# Patient Record
Sex: FEMALE | Race: WHITE | NOT HISPANIC OR LATINO | URBAN - METROPOLITAN AREA
[De-identification: names, ages, dates, MRNs, and addresses within clinical notes are randomized per-mention and may not be internally consistent; named-entity substitution may affect disease eponyms.]

---

## 2022-07-09 ENCOUNTER — TELEPHONE ENCOUNTER (OUTPATIENT)
Dept: URBAN - METROPOLITAN AREA CLINIC 121 | Facility: CLINIC | Age: 57
End: 2022-07-09

## 2022-07-10 ENCOUNTER — TELEPHONE ENCOUNTER (OUTPATIENT)
Dept: URBAN - METROPOLITAN AREA CLINIC 121 | Facility: CLINIC | Age: 57
End: 2022-07-10

## 2022-07-10 RX ORDER — MULTIVIT-MIN/FOLIC/VIT K/LYCOP 400-300MCG
TABLET ORAL
Refills: 0 | Status: ACTIVE | COMMUNITY
Start: 2014-09-09

## 2022-07-10 RX ORDER — LAMOTRIGINE 150 MG/1
TABLET ORAL TWICE A DAY
Refills: 0 | Status: ACTIVE | COMMUNITY
Start: 2014-09-09

## 2022-07-10 RX ORDER — ISOSORBIDE DINITRATE 30 MG/1
TABLET ORAL ONCE A DAY
Refills: 0 | Status: ACTIVE | COMMUNITY
Start: 2014-09-09

## 2022-07-10 RX ORDER — ASPIRIN 81 MG/1
TABLET, DELAYED RELEASE ORAL
Refills: 0 | Status: ACTIVE | COMMUNITY
Start: 2014-09-09

## 2022-07-10 RX ORDER — NITROGLYCERIN 80 MG/1
PATCH TRANSDERMAL AS NEEDED
Refills: 0 | Status: ACTIVE | COMMUNITY
Start: 2014-09-09

## 2022-07-10 RX ORDER — SIMVASTATIN 40 MG/1
TABLET, FILM COATED ORAL ONCE A DAY
Refills: 0 | Status: ACTIVE | COMMUNITY
Start: 2014-09-09

## 2022-12-02 ENCOUNTER — LAB OUTSIDE AN ENCOUNTER (OUTPATIENT)
Dept: URBAN - METROPOLITAN AREA CLINIC 63 | Facility: CLINIC | Age: 57
End: 2022-12-02

## 2022-12-02 ENCOUNTER — WEB ENCOUNTER (OUTPATIENT)
Dept: URBAN - METROPOLITAN AREA CLINIC 63 | Facility: CLINIC | Age: 57
End: 2022-12-02

## 2022-12-02 ENCOUNTER — OFFICE VISIT (OUTPATIENT)
Dept: URBAN - METROPOLITAN AREA CLINIC 63 | Facility: CLINIC | Age: 57
End: 2022-12-02
Payer: OTHER GOVERNMENT

## 2022-12-02 VITALS
DIASTOLIC BLOOD PRESSURE: 80 MMHG | BODY MASS INDEX: 25.77 KG/M2 | HEART RATE: 69 BPM | HEIGHT: 70 IN | WEIGHT: 180 LBS | OXYGEN SATURATION: 95 % | SYSTOLIC BLOOD PRESSURE: 120 MMHG | TEMPERATURE: 98.8 F

## 2022-12-02 DIAGNOSIS — K44.9 HIATAL HERNIA: ICD-10-CM

## 2022-12-02 DIAGNOSIS — R13.19 ESOPHAGEAL DYSPHAGIA: ICD-10-CM

## 2022-12-02 DIAGNOSIS — Z12.11 SCREENING FOR COLON CANCER: ICD-10-CM

## 2022-12-02 PROCEDURE — 99204 OFFICE O/P NEW MOD 45 MIN: CPT | Performed by: INTERNAL MEDICINE

## 2022-12-02 RX ORDER — ASPIRIN 81 MG/1
TABLET, DELAYED RELEASE ORAL
Refills: 0 | COMMUNITY
Start: 2014-09-09

## 2022-12-02 RX ORDER — NITROGLYCERIN 80 MG/1
PATCH TRANSDERMAL AS NEEDED
Refills: 0 | COMMUNITY
Start: 2014-09-09

## 2022-12-02 RX ORDER — LAMOTRIGINE 150 MG/1
TABLET ORAL TWICE A DAY
Refills: 0 | COMMUNITY
Start: 2014-09-09

## 2022-12-02 RX ORDER — ISOSORBIDE DINITRATE 30 MG/1
TABLET ORAL ONCE A DAY
Refills: 0 | COMMUNITY
Start: 2014-09-09

## 2022-12-02 RX ORDER — SIMVASTATIN 40 MG/1
TABLET, FILM COATED ORAL ONCE A DAY
Refills: 0 | COMMUNITY
Start: 2014-09-09

## 2022-12-02 RX ORDER — MULTIVIT-MIN/FOLIC/VIT K/LYCOP 400-300MCG
TABLET ORAL
Refills: 0 | COMMUNITY
Start: 2014-09-09

## 2022-12-02 RX ORDER — POLYETHYLENE GLYCOL 3350, SODIUM SULFATE, SODIUM CHLORIDE, POTASSIUM CHLORIDE, ASCORBIC ACID, SODIUM ASCORBATE 140-9-5.2G
AS DIRECTED KIT ORAL ONCE
Qty: 1 | Refills: 0 | OUTPATIENT
Start: 2022-12-02 | End: 2022-12-03

## 2022-12-02 NOTE — HPI-TODAY'S VISIT:
This a 57-year-old female with traumatic brain injury, nonobstructive CAD on multiple cardiac medications coming in for esophageal dysphagia.  Patient has had Nissen's fundoplication several years ago.  [facility] showed a paraesophageal hernia?  and the GE junction was dilated at that time to 57 French.  Patient has had multiple dilations.  She states that she has had a couple of dilations done this year as well.  Did not have a barium esophagram for review. Last colonoscopy for screening was done several years ago as well.  Does not recollect if she has polyps.  Has no sensation when she has a bowel movement and that has been going on for several years now.  Takes fiber as needed and that seems to help her.  No blood in the stool.  No family history of colon cancer. Does not drink significant amount of caffeine or soda.

## 2023-05-01 ENCOUNTER — TELEPHONE ENCOUNTER (OUTPATIENT)
Dept: URBAN - METROPOLITAN AREA CLINIC 63 | Facility: CLINIC | Age: 58
End: 2023-05-01

## 2023-05-03 ENCOUNTER — TELEPHONE ENCOUNTER (OUTPATIENT)
Dept: URBAN - METROPOLITAN AREA CLINIC 60 | Facility: CLINIC | Age: 58
End: 2023-05-03

## 2023-05-12 ENCOUNTER — OFFICE VISIT (OUTPATIENT)
Dept: URBAN - METROPOLITAN AREA CLINIC 60 | Facility: CLINIC | Age: 58
End: 2023-05-12

## 2023-05-31 ENCOUNTER — OFFICE VISIT (OUTPATIENT)
Dept: URBAN - METROPOLITAN AREA CLINIC 63 | Facility: CLINIC | Age: 58
End: 2023-05-31
Payer: OTHER GOVERNMENT

## 2023-05-31 ENCOUNTER — DASHBOARD ENCOUNTERS (OUTPATIENT)
Age: 58
End: 2023-05-31

## 2023-05-31 VITALS
HEIGHT: 70 IN | TEMPERATURE: 97.3 F | HEART RATE: 71 BPM | DIASTOLIC BLOOD PRESSURE: 86 MMHG | SYSTOLIC BLOOD PRESSURE: 124 MMHG | BODY MASS INDEX: 25.62 KG/M2 | WEIGHT: 179 LBS | OXYGEN SATURATION: 98 %

## 2023-05-31 DIAGNOSIS — K22.2 ESOPHAGEAL STRICTURE: ICD-10-CM

## 2023-05-31 DIAGNOSIS — R13.19 ESOPHAGEAL DYSPHAGIA: ICD-10-CM

## 2023-05-31 PROCEDURE — 99212 OFFICE O/P EST SF 10 MIN: CPT | Performed by: PHYSICIAN ASSISTANT

## 2023-05-31 RX ORDER — MULTIVIT-MIN/FOLIC/VIT K/LYCOP 400-300MCG
TABLET ORAL
Refills: 0 | COMMUNITY
Start: 2014-09-09

## 2023-05-31 RX ORDER — NITROGLYCERIN 80 MG/1
PATCH TRANSDERMAL AS NEEDED
Refills: 0 | COMMUNITY
Start: 2014-09-09

## 2023-05-31 NOTE — PHYSICAL EXAM CONSTITUTIONAL:
well developed, well nourished , in no acute distress , ambulating with some difficulty but not using any assistive device for ambulation , normal communication ability

## 2023-05-31 NOTE — HPI-HPI
Patient was seen in December by Dr. Zuniga. ------Last office visit:         This a 57-year-old female with traumatic brain injury, nonobstructive CAD on multiple cardiac medications coming in for esophageal dysphagia. Patient has had Nissen's fundoplication several years ago. [facility] in [facility] showed a paraesophageal hernia? and the GE junction was dilated at that time to 57 French. Patient has had multiple dilations. She states that she has had a couple of dilations done this year as well. Did not have a barium esophagram for review. Last colonoscopy for screening was done several years ago as well. Does not recollect if she has polyps. Has no sensation when she has a bowel movement and that has been going on for several years now. Takes fiber as needed and that seems to help her. No blood in the stool. No family history of colon cancer. Does not drink significant amount of caffeine or soda. - Dr. Zuniga 12/2022  ------9/18/2022 Barium Swallow: IMPRESSION: Narrowing of the distal esophagus at the level of the Nissen wrap which has herniated back above the diaphragm.   ------TODAY: Patient c/o dysphagia. [facility] is a [facility] and states that today is the last day of her [facility] care authorization. She didn't have her procedures due to no cardiac clearance was ever obtained. She complains that swallowing is worse, choking, and projectile vomiting. Gasping for air/choking because food gets stuck easily, water or solids, pills, etc. She is requesting egd with dilation. I told her that this was already ordered at her last visit in December and that we just need cardiac clearance before we can schedule these procedures in the hospital for her. She wants to cancel the colonoscopy because she is unable to complete the prep at home by herself and has no one who can help her with this. Because she has no feeling at her rectum and is incontinent of bowel and bladder she would not be able to prep without help. Patient has stridor, goes into airway spasms, airway closes. Needs to have procedure done in the hospital.

## 2023-06-01 PROBLEM — 63305008: Status: ACTIVE | Noted: 2023-06-01

## 2023-07-13 ENCOUNTER — TELEPHONE ENCOUNTER (OUTPATIENT)
Dept: URBAN - METROPOLITAN AREA CLINIC 63 | Facility: CLINIC | Age: 58
End: 2023-07-13

## 2023-08-25 ENCOUNTER — TELEPHONE ENCOUNTER (OUTPATIENT)
Dept: URBAN - METROPOLITAN AREA CLINIC 63 | Facility: CLINIC | Age: 58
End: 2023-08-25

## 2023-08-25 RX ORDER — MULTIVIT-MIN/FOLIC/VIT K/LYCOP 400-300MCG
TABLET ORAL
Refills: 0 | COMMUNITY
Start: 2014-09-09

## 2023-08-25 RX ORDER — POLYETHYLENE GLYCOL 3350, SODIUM SULFATE, SODIUM CHLORIDE, POTASSIUM CHLORIDE, ASCORBIC ACID, SODIUM ASCORBATE 140-9-5.2G
AS DIRECTED KIT ORAL 1
Qty: 140 GRAM | Refills: 0 | OUTPATIENT
Start: 2023-09-01 | End: 2023-09-02

## 2023-08-25 RX ORDER — NITROGLYCERIN 80 MG/1
PATCH TRANSDERMAL AS NEEDED
Refills: 0 | COMMUNITY
Start: 2014-09-09

## 2023-09-01 ENCOUNTER — LAB OUTSIDE AN ENCOUNTER (OUTPATIENT)
Dept: URBAN - METROPOLITAN AREA CLINIC 63 | Facility: CLINIC | Age: 58
End: 2023-09-01

## 2023-09-27 ENCOUNTER — TELEPHONE ENCOUNTER (OUTPATIENT)
Dept: URBAN - METROPOLITAN AREA CLINIC 64 | Facility: CLINIC | Age: 58
End: 2023-09-27

## 2023-09-27 ENCOUNTER — TELEPHONE ENCOUNTER (OUTPATIENT)
Dept: URBAN - METROPOLITAN AREA CLINIC 63 | Facility: CLINIC | Age: 58
End: 2023-09-27

## 2023-09-27 RX ORDER — POLYETHYLENE GLYCOL 3350, SODIUM SULFATE, SODIUM CHLORIDE, POTASSIUM CHLORIDE, ASCORBIC ACID, SODIUM ASCORBATE 140-9-5.2G
500 ML KIT ORAL 1
Qty: 500 MILLILITER | Refills: 0 | OUTPATIENT
Start: 2023-09-01

## 2023-10-02 ENCOUNTER — OFFICE VISIT (OUTPATIENT)
Dept: URBAN - METROPOLITAN AREA MEDICAL CENTER 14 | Facility: MEDICAL CENTER | Age: 58
End: 2023-10-02
Payer: OTHER GOVERNMENT

## 2023-10-02 ENCOUNTER — LAB OUTSIDE AN ENCOUNTER (OUTPATIENT)
Dept: URBAN - METROPOLITAN AREA CLINIC 63 | Facility: CLINIC | Age: 58
End: 2023-10-02

## 2023-10-02 ENCOUNTER — TELEPHONE ENCOUNTER (OUTPATIENT)
Dept: URBAN - METROPOLITAN AREA CLINIC 63 | Facility: CLINIC | Age: 58
End: 2023-10-02

## 2023-10-02 DIAGNOSIS — K22.2 ESOPHAGEAL STRICTURE: ICD-10-CM

## 2023-10-02 DIAGNOSIS — R13.19 ESOPHAGEAL DYSPHAGIA: ICD-10-CM

## 2023-10-02 PROCEDURE — 43249 ESOPH EGD DILATION <30 MM: CPT | Performed by: INTERNAL MEDICINE

## 2023-10-02 RX ORDER — SOD SULF/POT CHLORIDE/MAG SULF 1.479 G
12 TABLETS TABLET ORAL
Qty: 24 | Refills: 0 | OUTPATIENT
Start: 2023-10-02 | End: 2023-10-03

## 2023-10-02 RX ORDER — MULTIVIT-MIN/FOLIC/VIT K/LYCOP 400-300MCG
TABLET ORAL
Refills: 0 | COMMUNITY
Start: 2014-09-09

## 2023-10-02 RX ORDER — NITROGLYCERIN 80 MG/1
PATCH TRANSDERMAL AS NEEDED
Refills: 0 | COMMUNITY
Start: 2014-09-09

## 2023-10-02 RX ORDER — POLYETHYLENE GLYCOL 3350, SODIUM SULFATE, SODIUM CHLORIDE, POTASSIUM CHLORIDE, ASCORBIC ACID, SODIUM ASCORBATE 140-9-5.2G
500 ML KIT ORAL 1
Qty: 500 MILLILITER | Refills: 0 | Status: ACTIVE | COMMUNITY
Start: 2023-09-01

## 2023-10-19 ENCOUNTER — TELEPHONE ENCOUNTER (OUTPATIENT)
Dept: URBAN - METROPOLITAN AREA CLINIC 64 | Facility: CLINIC | Age: 58
End: 2023-10-19

## 2023-10-25 ENCOUNTER — TELEPHONE ENCOUNTER (OUTPATIENT)
Dept: URBAN - METROPOLITAN AREA CLINIC 63 | Facility: CLINIC | Age: 58
End: 2023-10-25

## 2023-10-25 ENCOUNTER — TELEPHONE ENCOUNTER (OUTPATIENT)
Dept: URBAN - METROPOLITAN AREA CLINIC 64 | Facility: CLINIC | Age: 58
End: 2023-10-25

## 2023-10-25 RX ORDER — SOD SULF/POT CHLORIDE/MAG SULF 1.479 G
12 TABLETS TABLET ORAL
Qty: 24 | Refills: 0
Start: 2023-10-02 | End: 2023-10-26

## 2023-10-26 ENCOUNTER — OFFICE VISIT (OUTPATIENT)
Dept: URBAN - METROPOLITAN AREA CLINIC 63 | Facility: CLINIC | Age: 58
End: 2023-10-26

## 2023-10-30 ENCOUNTER — OFFICE VISIT (OUTPATIENT)
Dept: URBAN - METROPOLITAN AREA MEDICAL CENTER 3 | Facility: MEDICAL CENTER | Age: 58
End: 2023-10-30
Payer: OTHER GOVERNMENT

## 2023-10-30 DIAGNOSIS — Z12.11 COLON CANCER SCREENING: ICD-10-CM

## 2023-10-30 DIAGNOSIS — K57.30 DIVERTCULOSIS OF LG INT W/O PERFORATION OR ABSCESS W/O BLEEDING: ICD-10-CM

## 2023-10-30 DIAGNOSIS — K63.5 COLON POLYPS: ICD-10-CM

## 2023-10-30 DIAGNOSIS — K64.0 GRADE I HEMORRHOIDS: ICD-10-CM

## 2023-10-30 PROCEDURE — 45385 COLONOSCOPY W/LESION REMOVAL: CPT | Performed by: INTERNAL MEDICINE

## 2023-10-30 RX ORDER — MULTIVIT-MIN/FOLIC/VIT K/LYCOP 400-300MCG
TABLET ORAL
Refills: 0 | COMMUNITY
Start: 2014-09-09

## 2023-10-30 RX ORDER — POLYETHYLENE GLYCOL 3350, SODIUM SULFATE, SODIUM CHLORIDE, POTASSIUM CHLORIDE, ASCORBIC ACID, SODIUM ASCORBATE 140-9-5.2G
500 ML KIT ORAL 1
Qty: 500 MILLILITER | Refills: 0 | Status: ACTIVE | COMMUNITY
Start: 2023-09-01

## 2023-10-30 RX ORDER — NITROGLYCERIN 80 MG/1
PATCH TRANSDERMAL AS NEEDED
Refills: 0 | COMMUNITY
Start: 2014-09-09

## 2023-11-06 ENCOUNTER — OFFICE VISIT (OUTPATIENT)
Dept: URBAN - METROPOLITAN AREA MEDICAL CENTER 14 | Facility: MEDICAL CENTER | Age: 58
End: 2023-11-06

## 2023-12-04 ENCOUNTER — OFFICE VISIT (OUTPATIENT)
Dept: URBAN - METROPOLITAN AREA MEDICAL CENTER 14 | Facility: MEDICAL CENTER | Age: 58
End: 2023-12-04